# Patient Record
(demographics unavailable — no encounter records)

---

## 2024-10-24 NOTE — HISTORY OF PRESENT ILLNESS
[FreeTextEntry6] : Fever x 3-4 days Tmax 101.5 F cough no vomiting, no diarrhea, no rash sore throat

## 2024-11-26 NOTE — HISTORY OF PRESENT ILLNESS
[de-identified] : Influenza Vaccination [FreeTextEntry6] : Four year old female presents for influenza vaccination. No fevers. Doing well.

## 2024-11-26 NOTE — DISCUSSION/SUMMARY
[FreeTextEntry1] : Four year old female received influenza vaccination. Tolerated well.   Will follow-up for next well check visit.  [] : The components of the vaccine(s) to be administered today are listed in the plan of care. The disease(s) for which the vaccine(s) are intended to prevent and the risks have been discussed with the caretaker.  The risks are also included in the appropriate vaccination information statements which have been provided to the patient's caregiver.  The caregiver has given consent to vaccinate.

## 2025-03-24 NOTE — DISCUSSION/SUMMARY
[FreeTextEntry1] : 4 yr old female with vaginal pain, likely vaginitis. UA WNL, will f/u with urine culture  Apply OTC hydrocortisone and topical neosporin. Return if symptoms worsen or persist.  -Avoid sleeper pajamas. Nightgowns allow air to circulate. -Use cotton underpants. Double-rinse underwear after washing to avoid residual irritants. Do not use fabric softeners for underwear and swimsuits. -Avoid tights, leotards, and leggings. Skirts and loose-fitting pants allow air to circulate. -Daily warm bathing is helpful as follows: Allow the child to soak in clean water (no soap) for 10 to 15 minutes. Adding vinegar or baking soda to the water has not been specifically studied but from our experience is not more efficacious than clean water alone.Use soap to wash regions other than the genital area just before taking the child out of the tub. Limit use of any soap on genital areas.Rinse the genital area well and gently pat dry. -A hair dryer on the cool setting may be helpful to assist with drying the genital region. -Do not use bubble baths or perfumed soaps. -If the vulvar area is tender or swollen, cool compresses may relieve the discomfort. Wet wipes can be used instead of toilet paper for wiping. Emollients may help protect skin. -Review hygiene with the child. Emphasize wiping front-to-back after bowel movements. Have her sit with knees apart to reduce reflux of urine into the vagina. If she has trouble with this position because of small size, she can use a smaller detachable seat or sit backwards on the toilet (facing the toilet). Children younger than five should be supervised or assisted in toilet hygiene. -Avoid letting children sit in wet swimsuits for long periods of time after swimming.

## 2025-03-24 NOTE — PHYSICAL EXAM
[Jonathan: ____] : Jonathan [unfilled] [Erythematous Labia Minora] : erythematous labia minora [NL] : warm, clear

## 2025-03-24 NOTE — HISTORY OF PRESENT ILLNESS
[ Symptoms] :  SYMPTOMS [Vaginal Pain] : vaginal pain [___ Week(s)] : [unfilled] week(s) [Intermittent] : intermittent [Dysuria] : dysuria [Recent Strep Infection] : no recent strep infection [Recent Viral Illness] : no recent viral illness [Recent Antibiotic Use: ___] : no recent antibiotic use [Fever] : no fever [URI symptoms] : no URI symptoms [Vomiting] : no vomiting [Abdominal Pain] : no abdominal pain [Diarrhea] : no diarrhea

## 2025-04-24 NOTE — PROCEDURE
[FreeTextEntry1] : Nasal Endoscopy [FreeTextEntry2] : Chronic Rhinitis [FreeTextEntry3] : After informed verbal consent, nasal endoscopy was performed due to anterior rhinoscopy insufficient to account for symptoms.   The nasal endoscope is passed via the nasal cavity. Flexible endoscope was used. Septum was midline. The inferior, middle, and superior turbinates and inferior, middle and superior meati were examined and with healthy appearing mucosa. The osteomeatal complex is clear with no polyposis or purulence. The sphenoethmoidal recess is clear with no polyposis or purulence. No masses or lesions noted. The choana is patent. The opposite nasal cavity and nasopharynx was examined with similar findings.   There is 90% obstruction of the nasopharynx with adenoid tissue. No other masses.   Patient tolerated the procedure well.

## 2025-04-24 NOTE — HISTORY OF PRESENT ILLNESS
[No Personal or Family History of Easy Bruising, Bleeding, or Issues with General Anesthesia] : No Personal or Family History of easy bruising, bleeding, or issues with general anesthesia [de-identified] : Rajani is a 4 year old girl here for initial evaluation for snoring. Here with parents who provide history.  Referred by PCP.  Wakes up frequently at night. Some daytime fatigue.  Loud nightly snoring. Parents have noticed choking and gasping.  Sometimes with cough at night.  No enuresis.  No previous sleep study.  No previous ENT surgeries.  Chronic nasal congestion and rhinorrhea.  Used Flonase in the past. Now uses nasal saline.  No difficulty eating/drinking. Picky eater.  Two lifetime strep throat infections.   Currently on day 5 of 10 day course Amoxicillin for left ear infection and strep throat.   Parents say difficulty at PCP with examining ears.  No parental concern with hearing or speech.  3 lifetime ear infections. 1 ear infection in the past 6 months.   No other otolaryngology concerns today.

## 2025-04-24 NOTE — CONSULT LETTER
[Dear  ___] : Dear  [unfilled], [Consult Letter:] : I had the pleasure of evaluating your patient, [unfilled]. [Please see my note below.] : Please see my note below. [Consult Closing:] : Thank you very much for allowing me to participate in the care of this patient.  If you have any questions, please do not hesitate to contact me. [Sincerely,] : Sincerely, [FreeTextEntry2] : Duane Gardiner [address] [place], [place] [id number] [phone] [FreeTextEntry3] : Hafsa [place] M.D.  Pediatric [place] [address] Tel [phone] Fax [phone]

## 2025-04-24 NOTE — BIRTH HISTORY
[At ___ Weeks Gestation] : at [unfilled] weeks gestation [Normal Vaginal Route] : by normal vaginal route [None] : No delivery complications [Passed] : passed [de-identified] : 29 weeks  labor admission but was able to stop it.

## 2025-04-24 NOTE — ASSESSMENT
[FreeTextEntry1] : We reviewed nasal endoscopy findings which demonstrate severe adenoid hypertrophy. We discussed options of obtaining sleep study to further characterize sleep disorder breathing or proceeding with adenotonsillectomy given significant symptoms correlating with physical exam findings. We discussed the risks, benefits, alternatives, and personnel involved in adenotonsillectomy. The risks include, but are not limited to, post-tonsillectomy bleeding that can range from minimal to life threatening, dehydration, throat pain, and speech changes. Expectations of one week out of school, two weeks out of sports/P.E., need for encouragement of fluid intake, limitation of diet to restrict hard/crunchy foods were discussed. If bleeding were to occur post-operatively parent is to bring the child immediately to the nearest emergency department.   The parent expresses understanding and wishes to proceed. Will plan for post-operative evaluation approximately one month post operatively.

## 2025-05-01 NOTE — HISTORY OF PRESENT ILLNESS
[Preoperative Visit] : for a medical evaluation prior to surgery [Good] : Good [Fever] : no fever [Chills] : no chills [Runny Nose] : no runny nose [Earache] : no earache [Headache] : no headache [Sore Throat] : no sore throat [Cough] : no cough [Appetite] : no decrease in appetite [Nausea] : no nausea [Vomiting] : no vomiting [Prior Anesthesia] : No prior anesthesia [Prev Anesthesia Reaction] : no previous anesthesia reaction [Diabetes] : no diabetes [Pulmonary Disease] : no pulmonary disease [Renal Disease] : no renal disease [Sleep Apnea] : no sleep apnea [Transfusion Reaction] : no transfusion reaction [Impaired Immunity] : no impaired immunity [Frequent use of NSAIDs] : no use of NSAIDs [Anesthesia Reaction] : no anesthesia reaction [Clotting Disorder] : no clotting disorder [Bleeding Disorder] : no bleeding disorder [Sudden Death] : no sudden death [FreeTextEntry1] : tonsils and adenoid removal [FreeTextEntry2] : 05/02/2025 [de-identified] : Vicente

## 2025-05-01 NOTE — HISTORY OF PRESENT ILLNESS
[Preoperative Visit] : for a medical evaluation prior to surgery [Good] : Good [Fever] : no fever [Chills] : no chills [Runny Nose] : no runny nose [Earache] : no earache [Headache] : no headache [Sore Throat] : no sore throat [Cough] : no cough [Appetite] : no decrease in appetite [Nausea] : no nausea [Vomiting] : no vomiting [Prior Anesthesia] : No prior anesthesia [Prev Anesthesia Reaction] : no previous anesthesia reaction [Diabetes] : no diabetes [Pulmonary Disease] : no pulmonary disease [Renal Disease] : no renal disease [Sleep Apnea] : no sleep apnea [Transfusion Reaction] : no transfusion reaction [Impaired Immunity] : no impaired immunity [Frequent use of NSAIDs] : no use of NSAIDs [Anesthesia Reaction] : no anesthesia reaction [Clotting Disorder] : no clotting disorder [Bleeding Disorder] : no bleeding disorder [Sudden Death] : no sudden death [FreeTextEntry1] : tonsils and adenoid removal [FreeTextEntry2] : 05/02/2025 [de-identified] : Vicente

## 2025-05-08 NOTE — ADDENDUM
[FreeTextEntry1] : Day 1 and 2 postop was normal, no issues. Day 3 pain was consistent throughout. Mom has been piggy-backing on motrin and tylenol, but pain seems to come back right before the time of dosage. Last night pt woke up every hour in pain. On the way to this appointment mom also said pt sneezed out something she is assuming is a scab.

## 2025-06-18 NOTE — HISTORY OF PRESENT ILLNESS
[de-identified] : CODIE is a 4 year old girl who presents for post op visit. She is here with parents who provides history. Now s/p T&A 5/2/25.  Took steroids x2 during recovery but is doing much better. No longer snoring and having nasal congestion. Occasional mouth breathing. Sleep quality has improved. No recent fevers or infections.  No other otolaryngology concerns.

## 2025-06-18 NOTE — CONSULT LETTER
[FreeTextEntry2] : Duane Gardiner [address] [place], [place] [id number] [phone] [FreeTextEntry3] : Hafsa [place] M.D.  [place] [address] Tel [phone] Fax [phone]

## 2025-06-18 NOTE — CONSULT LETTER
[FreeTextEntry2] : Duane Gardiner [address] [place], [place] [id number] [phone] [FreeTextEntry3] : Hafsa [place] M.D.  [place] [address] Tel [phone] Fax [phone]

## 2025-06-18 NOTE — ASSESSMENT
[FreeTextEntry1] : Recovered well s/p T&A. No further dietary or activity restrictions. Follow up as needed.

## 2025-06-18 NOTE — HISTORY OF PRESENT ILLNESS
[de-identified] : CODIE is a 4 year old girl who presents for post op visit. She is here with parents who provides history. Now s/p T&A 5/2/25.  Took steroids x2 during recovery but is doing much better. No longer snoring and having nasal congestion. Occasional mouth breathing. Sleep quality has improved. No recent fevers or infections.  No other otolaryngology concerns.